# Patient Record
Sex: FEMALE | Race: BLACK OR AFRICAN AMERICAN | HISPANIC OR LATINO | URBAN - METROPOLITAN AREA
[De-identification: names, ages, dates, MRNs, and addresses within clinical notes are randomized per-mention and may not be internally consistent; named-entity substitution may affect disease eponyms.]

---

## 2021-10-20 ENCOUNTER — IMMUNIZATION (OUTPATIENT)
Dept: PRIMARY CARE CLINIC | Facility: CLINIC | Age: 51
End: 2021-10-20

## 2021-10-20 DIAGNOSIS — Z23 NEED FOR VACCINATION: Primary | ICD-10-CM

## 2021-10-20 PROCEDURE — 0001A COVID-19, MRNA, LNP-S, PF, 30 MCG/0.3 ML DOSE VACCINE: CPT | Mod: CV19,PBBFAC | Performed by: INTERNAL MEDICINE

## 2024-06-08 ENCOUNTER — HOSPITAL ENCOUNTER (INPATIENT)
Facility: HOSPITAL | Age: 54
LOS: 1 days | Discharge: HOME | DRG: 313 | End: 2024-06-10
Attending: STUDENT IN AN ORGANIZED HEALTH CARE EDUCATION/TRAINING PROGRAM | Admitting: HOSPITALIST

## 2024-06-08 DIAGNOSIS — R73.9 HYPERGLYCEMIA: ICD-10-CM

## 2024-06-08 DIAGNOSIS — R07.9 CHEST PAIN: Primary | ICD-10-CM

## 2024-06-08 PROCEDURE — 80053 COMPREHEN METABOLIC PANEL: CPT | Performed by: STUDENT IN AN ORGANIZED HEALTH CARE EDUCATION/TRAINING PROGRAM

## 2024-06-08 PROCEDURE — 83036 HEMOGLOBIN GLYCOSYLATED A1C: CPT | Performed by: STUDENT IN AN ORGANIZED HEALTH CARE EDUCATION/TRAINING PROGRAM

## 2024-06-08 PROCEDURE — 83880 ASSAY OF NATRIURETIC PEPTIDE: CPT | Performed by: STUDENT IN AN ORGANIZED HEALTH CARE EDUCATION/TRAINING PROGRAM

## 2024-06-08 PROCEDURE — 85025 COMPLETE CBC W/AUTO DIFF WBC: CPT | Performed by: STUDENT IN AN ORGANIZED HEALTH CARE EDUCATION/TRAINING PROGRAM

## 2024-06-08 PROCEDURE — 84484 ASSAY OF TROPONIN QUANT: CPT | Performed by: STUDENT IN AN ORGANIZED HEALTH CARE EDUCATION/TRAINING PROGRAM

## 2024-06-08 PROCEDURE — 99285 EMERGENCY DEPT VISIT HI MDM: CPT | Mod: 25

## 2024-06-08 PROCEDURE — 83735 ASSAY OF MAGNESIUM: CPT | Performed by: STUDENT IN AN ORGANIZED HEALTH CARE EDUCATION/TRAINING PROGRAM

## 2024-06-09 ENCOUNTER — CLINICAL SUPPORT (OUTPATIENT)
Dept: CARDIOLOGY | Facility: HOSPITAL | Age: 54
DRG: 313 | End: 2024-06-09
Attending: HOSPITALIST

## 2024-06-09 VITALS — WEIGHT: 148 LBS | BODY MASS INDEX: 27.23 KG/M2 | HEIGHT: 62 IN

## 2024-06-09 VITALS — BODY MASS INDEX: 27.07 KG/M2 | HEIGHT: 62 IN

## 2024-06-09 PROBLEM — E11.9 DIABETES MELLITUS: Status: ACTIVE | Noted: 2024-06-09

## 2024-06-09 PROBLEM — K76.6 PORTAL HYPERTENSION: Status: ACTIVE | Noted: 2024-06-09

## 2024-06-09 PROBLEM — I10 ESSENTIAL HYPERTENSION: Status: ACTIVE | Noted: 2024-06-09

## 2024-06-09 PROBLEM — K76.6 PORTAL HYPERTENSION: Status: RESOLVED | Noted: 2024-06-09 | Resolved: 2024-06-09

## 2024-06-09 PROBLEM — R07.9 CHEST PAIN: Status: ACTIVE | Noted: 2024-06-09

## 2024-06-09 LAB
ALBUMIN SERPL BCP-MCNC: 3.5 G/DL (ref 3.5–5.2)
ALBUMIN SERPL BCP-MCNC: 3.8 G/DL (ref 3.5–5.2)
ALP SERPL-CCNC: 141 U/L (ref 55–135)
ALP SERPL-CCNC: 184 U/L (ref 55–135)
ALT SERPL W/O P-5'-P-CCNC: 15 U/L (ref 10–44)
ALT SERPL W/O P-5'-P-CCNC: 18 U/L (ref 10–44)
ANION GAP SERPL CALC-SCNC: 7 MMOL/L (ref 8–16)
ANION GAP SERPL CALC-SCNC: 8 MMOL/L (ref 8–16)
AORTIC ROOT ANNULUS: 2.6 CM
AORTIC VALVE CUSP SEPERATION: 1.6 CM
ASCENDING AORTA: 2.6 CM
AST SERPL-CCNC: 13 U/L (ref 10–40)
AST SERPL-CCNC: 14 U/L (ref 10–40)
AV INDEX (PROSTH): 0.81
AV MEAN GRADIENT: 2 MMHG
AV PEAK GRADIENT: 5 MMHG
AV VALVE AREA BY VELOCITY RATIO: 1.9 CM²
AV VALVE AREA: 2.29 CM²
AV VELOCITY RATIO: 0.67
BASOPHILS # BLD AUTO: 0.04 K/UL (ref 0–0.2)
BASOPHILS # BLD AUTO: 0.06 K/UL (ref 0–0.2)
BASOPHILS NFR BLD: 0.5 % (ref 0–1.9)
BASOPHILS NFR BLD: 0.7 % (ref 0–1.9)
BILIRUB SERPL-MCNC: 0.4 MG/DL (ref 0.1–1)
BILIRUB SERPL-MCNC: 0.5 MG/DL (ref 0.1–1)
BNP SERPL-MCNC: 17 PG/ML (ref 0–99)
BSA FOR ECHO PROCEDURE: 1.71 M2
BUN SERPL-MCNC: 7 MG/DL (ref 6–20)
BUN SERPL-MCNC: 8 MG/DL (ref 6–20)
CALCIUM SERPL-MCNC: 8.2 MG/DL (ref 8.7–10.5)
CALCIUM SERPL-MCNC: 8.6 MG/DL (ref 8.7–10.5)
CHLORIDE SERPL-SCNC: 103 MMOL/L (ref 95–110)
CHLORIDE SERPL-SCNC: 103 MMOL/L (ref 95–110)
CHOLEST SERPL-MCNC: 145 MG/DL (ref 120–199)
CHOLEST/HDLC SERPL: 3.6 {RATIO} (ref 2–5)
CO2 SERPL-SCNC: 23 MMOL/L (ref 23–29)
CO2 SERPL-SCNC: 25 MMOL/L (ref 23–29)
CREAT SERPL-MCNC: 0.5 MG/DL (ref 0.5–1.4)
CREAT SERPL-MCNC: 0.6 MG/DL (ref 0.5–1.4)
CV ECHO LV RWT: 0.62 CM
CV PHARM DOSE: 0.4 MG
CV STRESS BASE HR: 83 BPM
DIASTOLIC BLOOD PRESSURE: 81 MMHG
DIFFERENTIAL METHOD BLD: ABNORMAL
DIFFERENTIAL METHOD BLD: NORMAL
DOP CALC AO PEAK VEL: 1.09 M/S
DOP CALC AO VTI: 17.8 CM
DOP CALC LVOT AREA: 2.8 CM2
DOP CALC LVOT DIAMETER: 1.9 CM
DOP CALC LVOT PEAK VEL: 0.73 M/S
DOP CALC LVOT STROKE VOLUME: 40.81 CM3
DOP CALC MV VTI: 16.9 CM
DOP CALCLVOT PEAK VEL VTI: 14.4 CM
E WAVE DECELERATION TIME: 172 MSEC
E/A RATIO: 0.68
E/E' RATIO: 5.47 M/S
ECHO LV POSTERIOR WALL: 0.92 CM (ref 0.6–1.1)
EOSINOPHIL # BLD AUTO: 0.2 K/UL (ref 0–0.5)
EOSINOPHIL # BLD AUTO: 0.2 K/UL (ref 0–0.5)
EOSINOPHIL NFR BLD: 2.3 % (ref 0–8)
EOSINOPHIL NFR BLD: 2.5 % (ref 0–8)
ERYTHROCYTE [DISTWIDTH] IN BLOOD BY AUTOMATED COUNT: 12.3 % (ref 11.5–14.5)
ERYTHROCYTE [DISTWIDTH] IN BLOOD BY AUTOMATED COUNT: 12.4 % (ref 11.5–14.5)
EST. GFR  (NO RACE VARIABLE): >60 ML/MIN/1.73 M^2
EST. GFR  (NO RACE VARIABLE): >60 ML/MIN/1.73 M^2
ESTIMATED AVG GLUCOSE: 384 MG/DL (ref 68–131)
FRACTIONAL SHORTENING: 32 % (ref 28–44)
GLUCOSE SERPL-MCNC: 291 MG/DL (ref 70–110)
GLUCOSE SERPL-MCNC: 299 MG/DL (ref 70–110)
GLUCOSE SERPL-MCNC: 312 MG/DL (ref 70–110)
GLUCOSE SERPL-MCNC: 386 MG/DL (ref 70–110)
GLUCOSE SERPL-MCNC: 414 MG/DL (ref 70–110)
HBA1C MFR BLD: >15 % (ref 4.5–6.2)
HCT VFR BLD AUTO: 36.6 % (ref 37–48.5)
HCT VFR BLD AUTO: 37 % (ref 37–48.5)
HDLC SERPL-MCNC: 40 MG/DL (ref 40–75)
HDLC SERPL: 27.6 % (ref 20–50)
HGB BLD-MCNC: 12.6 G/DL (ref 12–16)
HGB BLD-MCNC: 12.8 G/DL (ref 12–16)
IMM GRANULOCYTES # BLD AUTO: 0.01 K/UL (ref 0–0.04)
IMM GRANULOCYTES # BLD AUTO: 0.02 K/UL (ref 0–0.04)
IMM GRANULOCYTES NFR BLD AUTO: 0.1 % (ref 0–0.5)
IMM GRANULOCYTES NFR BLD AUTO: 0.2 % (ref 0–0.5)
INTERVENTRICULAR SEPTUM: 1.02 CM (ref 0.6–1.1)
LA MAJOR: 3.8 CM
LA MINOR: 3 CM
LA WIDTH: 2 CM
LDLC SERPL CALC-MCNC: 62.2 MG/DL (ref 63–159)
LEFT ATRIUM SIZE: 3 CM
LEFT ATRIUM VOLUME INDEX MOD: 12.4 ML/M2
LEFT ATRIUM VOLUME INDEX: 10.2 ML/M2
LEFT ATRIUM VOLUME MOD: 20.8 CM3
LEFT ATRIUM VOLUME: 17.1 CM3
LEFT INTERNAL DIMENSION IN SYSTOLE: 2.04 CM (ref 2.1–4)
LEFT VENTRICLE DIASTOLIC VOLUME INDEX: 20.65 ML/M2
LEFT VENTRICLE DIASTOLIC VOLUME: 34.7 ML
LEFT VENTRICLE MASS INDEX: 46 G/M2
LEFT VENTRICLE SYSTOLIC VOLUME INDEX: 8 ML/M2
LEFT VENTRICLE SYSTOLIC VOLUME: 13.4 ML
LEFT VENTRICULAR INTERNAL DIMENSION IN DIASTOLE: 2.99 CM (ref 3.5–6)
LEFT VENTRICULAR MASS: 78.05 G
LV LATERAL E/E' RATIO: 4.73 M/S
LV SEPTAL E/E' RATIO: 6.5 M/S
LVOT MG: 1 MMHG
LVOT MV: 0.46 CM/S
LYMPHOCYTES # BLD AUTO: 3.2 K/UL (ref 1–4.8)
LYMPHOCYTES # BLD AUTO: 4.1 K/UL (ref 1–4.8)
LYMPHOCYTES NFR BLD: 41.2 % (ref 18–48)
LYMPHOCYTES NFR BLD: 49.1 % (ref 18–48)
MAGNESIUM SERPL-MCNC: 1.6 MG/DL (ref 1.6–2.6)
MAGNESIUM SERPL-MCNC: 1.7 MG/DL (ref 1.6–2.6)
MCH RBC QN AUTO: 30.1 PG (ref 27–31)
MCH RBC QN AUTO: 30.2 PG (ref 27–31)
MCHC RBC AUTO-ENTMCNC: 34.4 G/DL (ref 32–36)
MCHC RBC AUTO-ENTMCNC: 34.6 G/DL (ref 32–36)
MCV RBC AUTO: 87 FL (ref 82–98)
MCV RBC AUTO: 88 FL (ref 82–98)
MONOCYTES # BLD AUTO: 0.5 K/UL (ref 0.3–1)
MONOCYTES # BLD AUTO: 0.6 K/UL (ref 0.3–1)
MONOCYTES NFR BLD: 6.7 % (ref 4–15)
MONOCYTES NFR BLD: 7 % (ref 4–15)
MV MEAN GRADIENT: 1 MMHG
MV PEAK A VEL: 0.76 M/S
MV PEAK E VEL: 0.52 M/S
MV PEAK GRADIENT: 3 MMHG
MV STENOSIS PRESSURE HALF TIME: 50 MS
MV VALVE AREA BY CONTINUITY EQUATION: 2.41 CM2
MV VALVE AREA P 1/2 METHOD: 4.4 CM2
NEUTROPHILS # BLD AUTO: 3.4 K/UL (ref 1.8–7.7)
NEUTROPHILS # BLD AUTO: 3.8 K/UL (ref 1.8–7.7)
NEUTROPHILS NFR BLD: 40.7 % (ref 38–73)
NEUTROPHILS NFR BLD: 49 % (ref 38–73)
NONHDLC SERPL-MCNC: 105 MG/DL
NRBC BLD-RTO: 0 /100 WBC
NRBC BLD-RTO: 0 /100 WBC
OHS CV CPX 1 MINUTE RECOVERY HEART RATE: 91 BPM
OHS CV CPX 85 PERCENT MAX PREDICTED HEART RATE MALE: 139
OHS CV CPX MAX PREDICTED HEART RATE: 164
OHS CV CPX PATIENT IS FEMALE: 1
OHS CV CPX PATIENT IS MALE: 0
OHS CV CPX PEAK DIASTOLIC BLOOD PRESSURE: 92 MMHG
OHS CV CPX PEAK HEAR RATE: 92 BPM
OHS CV CPX PEAK RATE PRESSURE PRODUCT: NORMAL
OHS CV CPX PEAK SYSTOLIC BLOOD PRESSURE: 141 MMHG
OHS CV CPX PERCENT MAX PREDICTED HEART RATE ACHIEVED: 59
OHS CV CPX RATE PRESSURE PRODUCT PRESENTING: NORMAL
OHS LV EJECTION FRACTION SIMPSONS BIPLANE MOD: 59 %
PLATELET # BLD AUTO: 266 K/UL (ref 150–450)
PLATELET # BLD AUTO: 268 K/UL (ref 150–450)
PMV BLD AUTO: 10.4 FL (ref 9.2–12.9)
PMV BLD AUTO: 10.9 FL (ref 9.2–12.9)
POTASSIUM SERPL-SCNC: 3.8 MMOL/L (ref 3.5–5.1)
POTASSIUM SERPL-SCNC: 3.9 MMOL/L (ref 3.5–5.1)
PROT SERPL-MCNC: 6.2 G/DL (ref 6–8.4)
PROT SERPL-MCNC: 6.7 G/DL (ref 6–8.4)
PV MV: 0.57 M/S
PV PEAK GRADIENT: 3 MMHG
PV PEAK VELOCITY: 0.83 M/S
RA MAJOR: 3.28 CM
RA PRESSURE ESTIMATED: 3 MMHG
RA WIDTH: 1.76 CM
RBC # BLD AUTO: 4.18 M/UL (ref 4–5.4)
RBC # BLD AUTO: 4.24 M/UL (ref 4–5.4)
RIGHT VENTRICULAR LENGTH IN DIASTOLE (APICAL 4-CHAMBER VIEW): 5.1 CM
RV MID DIAMA: 0.81 CM
RV TISSUE DOPPLER FREE WALL SYSTOLIC VELOCITY 1 (APICAL 4 CHAMBER VIEW): 11.2 CM/S
SODIUM SERPL-SCNC: 133 MMOL/L (ref 136–145)
SODIUM SERPL-SCNC: 136 MMOL/L (ref 136–145)
SYSTOLIC BLOOD PRESSURE: 134 MMHG
TDI LATERAL: 0.11 M/S
TDI SEPTAL: 0.08 M/S
TDI: 0.1 M/S
TRICUSPID ANNULAR PLANE SYSTOLIC EXCURSION: 2.43 CM
TRIGL SERPL-MCNC: 214 MG/DL (ref 30–150)
TROPONIN I SERPL HS-MCNC: 12.9 PG/ML (ref 0–14.9)
TROPONIN I SERPL HS-MCNC: 18.2 PG/ML (ref 0–14.9)
TROPONIN I SERPL HS-MCNC: 7.9 PG/ML (ref 0–14.9)
WBC # BLD AUTO: 7.74 K/UL (ref 3.9–12.7)
WBC # BLD AUTO: 8.43 K/UL (ref 3.9–12.7)
Z-SCORE OF LEFT VENTRICULAR DIMENSION IN END DIASTOLE: -4.5
Z-SCORE OF LEFT VENTRICULAR DIMENSION IN END SYSTOLE: -2.79

## 2024-06-09 PROCEDURE — 25000003 PHARM REV CODE 250: Performed by: HOSPITALIST

## 2024-06-09 PROCEDURE — 25000003 PHARM REV CODE 250: Performed by: STUDENT IN AN ORGANIZED HEALTH CARE EDUCATION/TRAINING PROGRAM

## 2024-06-09 PROCEDURE — 12000002 HC ACUTE/MED SURGE SEMI-PRIVATE ROOM

## 2024-06-09 PROCEDURE — 93005 ELECTROCARDIOGRAM TRACING: CPT | Performed by: GENERAL PRACTICE

## 2024-06-09 PROCEDURE — 93306 TTE W/DOPPLER COMPLETE: CPT

## 2024-06-09 PROCEDURE — 93306 TTE W/DOPPLER COMPLETE: CPT | Mod: 26,,, | Performed by: STUDENT IN AN ORGANIZED HEALTH CARE EDUCATION/TRAINING PROGRAM

## 2024-06-09 PROCEDURE — 84484 ASSAY OF TROPONIN QUANT: CPT | Mod: 91 | Performed by: HOSPITALIST

## 2024-06-09 PROCEDURE — 93017 CV STRESS TEST TRACING ONLY: CPT

## 2024-06-09 PROCEDURE — 94761 N-INVAS EAR/PLS OXIMETRY MLT: CPT

## 2024-06-09 PROCEDURE — 85025 COMPLETE CBC W/AUTO DIFF WBC: CPT | Performed by: HOSPITALIST

## 2024-06-09 PROCEDURE — 63600175 PHARM REV CODE 636 W HCPCS: Performed by: HOSPITALIST

## 2024-06-09 PROCEDURE — 96360 HYDRATION IV INFUSION INIT: CPT

## 2024-06-09 PROCEDURE — 93016 CV STRESS TEST SUPVJ ONLY: CPT | Mod: ,,, | Performed by: STUDENT IN AN ORGANIZED HEALTH CARE EDUCATION/TRAINING PROGRAM

## 2024-06-09 PROCEDURE — 93010 ELECTROCARDIOGRAM REPORT: CPT | Mod: ,,, | Performed by: GENERAL PRACTICE

## 2024-06-09 PROCEDURE — 84484 ASSAY OF TROPONIN QUANT: CPT | Performed by: STUDENT IN AN ORGANIZED HEALTH CARE EDUCATION/TRAINING PROGRAM

## 2024-06-09 PROCEDURE — 83735 ASSAY OF MAGNESIUM: CPT | Performed by: HOSPITALIST

## 2024-06-09 PROCEDURE — 80053 COMPREHEN METABOLIC PANEL: CPT | Performed by: HOSPITALIST

## 2024-06-09 PROCEDURE — 93018 CV STRESS TEST I&R ONLY: CPT | Mod: ,,, | Performed by: STUDENT IN AN ORGANIZED HEALTH CARE EDUCATION/TRAINING PROGRAM

## 2024-06-09 PROCEDURE — 80061 LIPID PANEL: CPT | Performed by: HOSPITALIST

## 2024-06-09 RX ORDER — ACETAMINOPHEN 325 MG/1
650 TABLET ORAL EVERY 8 HOURS PRN
Status: DISCONTINUED | OUTPATIENT
Start: 2024-06-09 | End: 2024-06-10 | Stop reason: HOSPADM

## 2024-06-09 RX ORDER — HYDRALAZINE HYDROCHLORIDE 20 MG/ML
10 INJECTION INTRAMUSCULAR; INTRAVENOUS
Status: DISCONTINUED | OUTPATIENT
Start: 2024-06-09 | End: 2024-06-10 | Stop reason: HOSPADM

## 2024-06-09 RX ORDER — ALUMINUM HYDROXIDE, MAGNESIUM HYDROXIDE, AND SIMETHICONE 1200; 120; 1200 MG/30ML; MG/30ML; MG/30ML
30 SUSPENSION ORAL 4 TIMES DAILY PRN
Status: DISCONTINUED | OUTPATIENT
Start: 2024-06-09 | End: 2024-06-10 | Stop reason: HOSPADM

## 2024-06-09 RX ORDER — ATORVASTATIN CALCIUM 40 MG/1
40 TABLET, FILM COATED ORAL DAILY
Status: DISCONTINUED | OUTPATIENT
Start: 2024-06-09 | End: 2024-06-10 | Stop reason: HOSPADM

## 2024-06-09 RX ORDER — IBUPROFEN 200 MG
16 TABLET ORAL
Status: DISCONTINUED | OUTPATIENT
Start: 2024-06-09 | End: 2024-06-10 | Stop reason: HOSPADM

## 2024-06-09 RX ORDER — CARVEDILOL 3.12 MG/1
3.12 TABLET ORAL 2 TIMES DAILY
Status: DISCONTINUED | OUTPATIENT
Start: 2024-06-09 | End: 2024-06-10 | Stop reason: HOSPADM

## 2024-06-09 RX ORDER — NITROGLYCERIN 0.4 MG/1
0.4 TABLET SUBLINGUAL EVERY 5 MIN PRN
Status: DISCONTINUED | OUTPATIENT
Start: 2024-06-09 | End: 2024-06-10 | Stop reason: HOSPADM

## 2024-06-09 RX ORDER — NAPROXEN SODIUM 220 MG/1
81 TABLET, FILM COATED ORAL DAILY
Status: DISCONTINUED | OUTPATIENT
Start: 2024-06-09 | End: 2024-06-10 | Stop reason: HOSPADM

## 2024-06-09 RX ORDER — INSULIN ASPART 100 [IU]/ML
0-10 INJECTION, SOLUTION INTRAVENOUS; SUBCUTANEOUS EVERY 6 HOURS PRN
Status: DISCONTINUED | OUTPATIENT
Start: 2024-06-09 | End: 2024-06-10 | Stop reason: HOSPADM

## 2024-06-09 RX ORDER — REGADENOSON 0.08 MG/ML
0.4 INJECTION, SOLUTION INTRAVENOUS
Status: COMPLETED | OUTPATIENT
Start: 2024-06-09 | End: 2024-06-09

## 2024-06-09 RX ORDER — IBUPROFEN 200 MG
24 TABLET ORAL
Status: DISCONTINUED | OUTPATIENT
Start: 2024-06-09 | End: 2024-06-10 | Stop reason: HOSPADM

## 2024-06-09 RX ORDER — AMOXICILLIN 250 MG
1 CAPSULE ORAL DAILY PRN
Status: DISCONTINUED | OUTPATIENT
Start: 2024-06-09 | End: 2024-06-10 | Stop reason: HOSPADM

## 2024-06-09 RX ORDER — ONDANSETRON HYDROCHLORIDE 2 MG/ML
4 INJECTION, SOLUTION INTRAVENOUS EVERY 6 HOURS PRN
Status: DISCONTINUED | OUTPATIENT
Start: 2024-06-09 | End: 2024-06-10 | Stop reason: HOSPADM

## 2024-06-09 RX ORDER — GLUCAGON 1 MG
1 KIT INJECTION
Status: DISCONTINUED | OUTPATIENT
Start: 2024-06-09 | End: 2024-06-10 | Stop reason: HOSPADM

## 2024-06-09 RX ORDER — GLUCAGON 1 MG
1 KIT INJECTION
Status: DISCONTINUED | OUTPATIENT
Start: 2024-06-09 | End: 2024-06-09

## 2024-06-09 RX ORDER — SODIUM CHLORIDE 9 MG/ML
INJECTION, SOLUTION INTRAVENOUS CONTINUOUS
Status: DISCONTINUED | OUTPATIENT
Start: 2024-06-09 | End: 2024-06-10 | Stop reason: HOSPADM

## 2024-06-09 RX ORDER — NALOXONE HCL 0.4 MG/ML
0.02 VIAL (ML) INJECTION
Status: DISCONTINUED | OUTPATIENT
Start: 2024-06-09 | End: 2024-06-10 | Stop reason: HOSPADM

## 2024-06-09 RX ORDER — TALC
6 POWDER (GRAM) TOPICAL NIGHTLY PRN
Status: DISCONTINUED | OUTPATIENT
Start: 2024-06-09 | End: 2024-06-10 | Stop reason: HOSPADM

## 2024-06-09 RX ORDER — ACETAMINOPHEN 325 MG/1
650 TABLET ORAL EVERY 4 HOURS PRN
Status: DISCONTINUED | OUTPATIENT
Start: 2024-06-09 | End: 2024-06-10 | Stop reason: HOSPADM

## 2024-06-09 RX ORDER — LANOLIN ALCOHOL/MO/W.PET/CERES
800 CREAM (GRAM) TOPICAL
Status: DISCONTINUED | OUTPATIENT
Start: 2024-06-09 | End: 2024-06-10 | Stop reason: HOSPADM

## 2024-06-09 RX ORDER — SODIUM,POTASSIUM PHOSPHATES 280-250MG
2 POWDER IN PACKET (EA) ORAL
Status: DISCONTINUED | OUTPATIENT
Start: 2024-06-09 | End: 2024-06-10 | Stop reason: HOSPADM

## 2024-06-09 RX ORDER — INSULIN GLARGINE 100 [IU]/ML
4 INJECTION, SOLUTION SUBCUTANEOUS DAILY
Status: DISCONTINUED | OUTPATIENT
Start: 2024-06-09 | End: 2024-06-10

## 2024-06-09 RX ORDER — HYDROCODONE BITARTRATE AND ACETAMINOPHEN 5; 325 MG/1; MG/1
1 TABLET ORAL EVERY 6 HOURS PRN
Status: DISCONTINUED | OUTPATIENT
Start: 2024-06-09 | End: 2024-06-10 | Stop reason: HOSPADM

## 2024-06-09 RX ORDER — SODIUM CHLORIDE 0.9 % (FLUSH) 0.9 %
10 SYRINGE (ML) INJECTION EVERY 12 HOURS PRN
Status: DISCONTINUED | OUTPATIENT
Start: 2024-06-09 | End: 2024-06-10 | Stop reason: HOSPADM

## 2024-06-09 RX ADMIN — SODIUM CHLORIDE 1000 ML: 9 INJECTION, SOLUTION INTRAVENOUS at 03:06

## 2024-06-09 RX ADMIN — SODIUM CHLORIDE: 9 INJECTION, SOLUTION INTRAVENOUS at 05:06

## 2024-06-09 RX ADMIN — INSULIN ASPART 5 UNITS: 100 INJECTION, SOLUTION INTRAVENOUS; SUBCUTANEOUS at 10:06

## 2024-06-09 RX ADMIN — REGADENOSON 0.4 MG: 0.08 INJECTION, SOLUTION INTRAVENOUS at 11:06

## 2024-06-09 RX ADMIN — CARVEDILOL 3.12 MG: 3.12 TABLET, FILM COATED ORAL at 08:06

## 2024-06-09 RX ADMIN — ASPIRIN 81 MG 81 MG: 81 TABLET ORAL at 08:06

## 2024-06-09 RX ADMIN — INSULIN ASPART 6 UNITS: 100 INJECTION, SOLUTION INTRAVENOUS; SUBCUTANEOUS at 08:06

## 2024-06-09 RX ADMIN — CARVEDILOL 3.12 MG: 3.12 TABLET, FILM COATED ORAL at 09:06

## 2024-06-09 RX ADMIN — ATORVASTATIN CALCIUM 40 MG: 40 TABLET, FILM COATED ORAL at 08:06

## 2024-06-09 RX ADMIN — INSULIN GLARGINE 4 UNITS: 100 INJECTION, SOLUTION SUBCUTANEOUS at 01:06

## 2024-06-09 NOTE — PLAN OF CARE
Patient seen and examined.  Family at bedside.  Requested use of .  Used  [id number].    Patient denies current chest pain.  Denies any shortness a breath headache or any other complaints at this time.  Informed her that stress test is negative.  Echocardiogram with normal wall motion, normal systolic and diastolic function.  No further cardiac workup needed at this time.    What concerns me at this point is her A1c being greater than 15.  She tells me she takes metformin at home for diabetes.  I informed her that I do not think metformin alone is enough at this point and we need to start insulin.  I started her on long-acting insulin this morning.  We will need to monitor on this and titrate up.  We will need case management involvement with discharge planning for medications as patient is self pay.  We will consult with diabetes educator. Hopeful for discharge tomorrow.

## 2024-06-09 NOTE — PLAN OF CARE
[facility] - Emergency Dept  Initial Discharge Assessment       Primary Care Provider: No, Primary Doctor    Admission Diagnosis: Chest pain [R07.9]    Admission Date: 6/8/2024  Expected Discharge Date:     Pt is a 54-year-old female who arrived from home with Chest Pain problem. Information verified as correct on facesheet. The assessment was completed at the patient's bedside with daughters.  Pt lives with Jagdish Harman (Daughter) [phone]. Pt does not have a Living Will or Advance Directive, Pt also needs an  (Albanian). The Pt is oriented to person, places, and times. Pt does not have a PCP.  Pt denies Coumadin, dialysis, DME, HH, and has not been readmitted into the hospital in the last thirty days.  Pt is capable of performing ADLs without assistance. Pt daughter her drives to and from medical appointments. Pt reports she was on medication, but  she was not taking it as prescribed; pharmacy used was [facility].  Pt verbalized plan to discharge home via family transport. Pt has no other needs to be addressed at this time. CM reviewed the chart and will continue to monitor.           Payor: /     Extended Emergency Contact Information  Primary Emergency Contact: SIENA CARMONA  Mobile Phone: [phone]  Relation: Son  Secondary Emergency Contact: Jagdish Harman  Mobile Phone: [phone]  Relation: Daughter  Preferred language: Albanian   needed? Yes    Discharge Plan A: Home with family  Discharge Plan B: Home with family    No Pharmacies Listed    Initial Assessment (most recent)       Adult Discharge Assessment - 06/09/24 1402          Discharge Assessment    Assessment Type Discharge Planning Assessment     Confirmed/corrected address, phone number and insurance Yes     Confirmed Demographics Correct on Facesheet     Source of Information patient;family     When was your last doctors appointment? --   Pt has a language barrier and she does not have a PCP. Need  to be referred for Medicaid.    Does patient/caregiver understand observation status Yes     Reason For Admission Chest Pain     People in Home child(brian), adult     Do you expect to return to your current living situation? Yes     Do you have help at home or someone to help you manage your care at home? Yes     Who are your caregiver(s) and their phone number(s)? Jagdish Harman  Daughter  [phone]  SIENA CARMONA (Son)  [phone] (Mobile)     Walking or Climbing Stairs Difficulty no     Dressing/Bathing Difficulty no     Home Layout Able to live on 1st floor     Equipment Currently Used at Home none     Readmission within 30 days? No     Patient currently being followed by outpatient case management? No     Do you currently have service(s) that help you manage your care at home? No     Do you take prescription medications? No     Do you have prescription coverage? No     Do you have any problems affording any of your prescribed medications? Yes   Pt does not have any insurance/language barrier    Is the patient taking medications as prescribed? no   Pt is not taking any meds right now    How do you get to doctors appointments? --   Pt does not go to the doctor due to no insurance.    Are you on dialysis? No     Do you take coumadin? No     Discharge Plan A Home with family     Discharge Plan B Home with family     DME Needed Upon Discharge  none

## 2024-06-09 NOTE — NURSING
Nurses Note -- 4 Eyes      6/9/2024   3:25 PM      Skin assessed during: Transfer      [x] No Altered Skin Integrity Present    []Prevention Measures Documented      [] Yes- Altered Skin Integrity Present or Discovered   [] LDA Added if Not in Epic (Describe Wound)   [] New Altered Skin Integrity was Present on Admit and Documented in LDA   [] Wound Image Taken    Wound Care Consulted? No    Attending Nurse:Nicholas Silva RN/Staff Member:   Lillie LOUIS

## 2024-06-09 NOTE — ASSESSMENT & PLAN NOTE
Follow troponin   Echocardiogram   Stress test in the morning  Aspirin 81 mg daily  Atorvastatin  Nitroglycerin  Lipid profile  Telemetry

## 2024-06-09 NOTE — ED PROVIDER NOTES
Encounter Date: 6/8/2024       History     Chief Complaint   Patient presents with    Chest Pain     Pt was in a car and screamed out in pain .  Pt says it is like I knife in the center of her chest     HPI    Stephanie Harman is a 54 y.o. female with a past medical history of hypertension and diabetes, presents with acute onset of chest pain.  Patient was in a car, coming home from a [employer] service with other [employer] members when she started feeling a sharp pain in the center of her chest.  Patient also reports that she felt short of breath, and felt like she wanted to cry.  Patient reports that the pain has improved now.  Patient receives 324 of aspirin with EMS in addition to sublingual nitro, and 500 cc of normal saline.  Patient reports that she does not take her blood pressure medication or diabetes medications secondary to feeling fine and only takes them when she feels sick.  Last time she took any of these medication was a week ago.  Denies headache, dizziness, nausea, vomiting, diaphoresis, dysuria, constipation, rash, lower extremity edema.  Patient reports she has had similar pain in the past but has not seen a primary care physician.      Review of patient's allergies indicates:  No Known Allergies  No past medical history on file.  No past surgical history on file.  No family history on file.     Review of Systems  See HPI above    Physical Exam     Initial Vitals [06/08/24 2255]   BP Pulse Resp Temp SpO2   (!) 164/101 93 20 98.1 °F (36.7 °C) 98 %      MAP       --         Physical Exam    Nursing note and vitals reviewed.  Constitutional: She appears well-developed and well-nourished.   HENT:   Head: Normocephalic and atraumatic.   Eyes: EOM are normal. Pupils are equal, round, and reactive to light.   Neck: Neck supple.   Normal range of motion.  Cardiovascular:  Normal rate, regular rhythm and normal heart sounds.           Pulmonary/Chest: Breath sounds normal. No respiratory distress. She has no wheezes.  She has no rhonchi. She has no rales. She exhibits no tenderness.   Abdominal: Abdomen is soft. She exhibits no distension. There is no abdominal tenderness.   Musculoskeletal:         General: No tenderness or edema. Normal range of motion.      Cervical back: Normal range of motion and neck supple.     Neurological: She is alert and oriented to person, place, and time. GCS score is 15. GCS eye subscore is 4. GCS verbal subscore is 5. GCS motor subscore is 6.   Skin: Skin is warm and dry. Capillary refill takes less than 2 seconds.         ED Course   Procedures  Labs Reviewed   CBC W/ AUTO DIFFERENTIAL - Abnormal; Notable for the following components:       Result Value    Hematocrit 36.6 (*)     Lymph % 49.1 (*)     All other components within normal limits   COMPREHENSIVE METABOLIC PANEL - Abnormal; Notable for the following components:    Sodium 133 (*)     Glucose 414 (*)     Calcium 8.6 (*)     Alkaline Phosphatase 184 (*)     Anion Gap 7 (*)     All other components within normal limits   TROPONIN I HIGH SENSITIVITY - Abnormal; Notable for the following components:    Troponin I High Sensitivity 18.2 (*)     All other components within normal limits   MAGNESIUM   TROPONIN I HIGH SENSITIVITY   B-TYPE NATRIURETIC PEPTIDE   HEMOGLOBIN A1C   COMPREHENSIVE METABOLIC PANEL   MAGNESIUM   CBC W/ AUTO DIFFERENTIAL   TROPONIN I HIGH SENSITIVITY   LIPID PANEL   POCT GLUCOSE MONITORING CONTINUOUS   POCT GLUCOSE MONITORING CONTINUOUS   POCT GLUCOSE MONITORING CONTINUOUS     EKG Readings: (Independently Interpreted)   Initial Reading: No STEMI. Rhythm: Normal Sinus Rhythm. Heart Rate: 86. ST Segments: Normal ST Segments. T Waves: Normal.       Imaging Results              X-Ray Chest AP Portable (Final result)  Result time 06/08/24 23:47:10      Final result by Lorenzo Cote MD (06/08/24 23:47:10)                   Impression:      Tortuosity of the thoracic aorta.  Some component of ectasia may present.  Additional  evaluation, as clinically warranted.      Electronically signed by: Lorenzo Cote MD  Date:    06/08/2024  Time:    23:47               Narrative:    EXAMINATION:  XR CHEST AP PORTABLE    CLINICAL HISTORY:  Chest Pain;    TECHNIQUE:  Single frontal view of the chest was performed.    COMPARISON:  None    FINDINGS:  The trachea is unremarkable.  The cardiac silhouette is within normal limits.  There is tortuosity of the thoracic aorta.  The hilar structures are unremarkable.  There is no evidence of free air beneath the hemidiaphragms.  There are no pleural effusions.  There is no evidence of a pneumothorax.  There is no evidence of pneumomediastinum.  No airspace opacity is present.  There are degenerative changes in the osseous structures.                                       Medications   sodium chloride 0.9% flush 10 mL (has no administration in time range)   melatonin tablet 6 mg (has no administration in time range)   ondansetron injection 4 mg (has no administration in time range)   senna-docusate 8.6-50 mg per tablet 1 tablet (has no administration in time range)   acetaminophen tablet 650 mg (has no administration in time range)   aluminum-magnesium hydroxide-simethicone 200-200-20 mg/5 mL suspension 30 mL (has no administration in time range)   acetaminophen tablet 650 mg (has no administration in time range)   HYDROcodone-acetaminophen 5-325 mg per tablet 1 tablet (has no administration in time range)   naloxone 0.4 mg/mL injection 0.02 mg (has no administration in time range)   potassium bicarbonate disintegrating tablet 50 mEq (has no administration in time range)   potassium bicarbonate disintegrating tablet 35 mEq (has no administration in time range)   potassium bicarbonate disintegrating tablet 60 mEq (has no administration in time range)   magnesium oxide tablet 800 mg (has no administration in time range)   magnesium oxide tablet 800 mg (has no administration in time range)   potassium, sodium  phosphates 280-160-250 mg packet 2 packet (has no administration in time range)   potassium, sodium phosphates 280-160-250 mg packet 2 packet (has no administration in time range)   potassium, sodium phosphates 280-160-250 mg packet 2 packet (has no administration in time range)   glucose chewable tablet 16 g (has no administration in time range)   glucose chewable tablet 24 g (has no administration in time range)   dextrose 50% injection 25 g (has no administration in time range)   aspirin chewable tablet 81 mg (has no administration in time range)   nitroGLYCERIN SL tablet 0.4 mg (has no administration in time range)   atorvastatin tablet 40 mg (has no administration in time range)   dextrose 50% injection 12.5 g (has no administration in time range)   glucagon (human recombinant) injection 1 mg (has no administration in time range)   insulin aspart U-100 pen 0-10 Units (has no administration in time range)   hydrALAZINE injection 10 mg (has no administration in time range)   carvediloL tablet 3.125 mg (has no administration in time range)   0.9%  NaCl infusion ( Intravenous New Bag 6/9/24 0514)   sodium chloride 0.9% bolus 1,000 mL 1,000 mL (0 mLs Intravenous Stopped 6/9/24 3595)     Medical Decision Making  54 y.o. female with a past medical history of hypertension and diabetes, presents with acute onset of chest pain.  Patient was in a car, coming home from a [employer] service with other Rast[employer] members when she started feeling a sharp pain in the center of her chest.  Patient also reports that she felt short of breath, and felt like she wanted to cry.  Exam as above.On CBC patient has no leukocytosis or anemia.  On CMP patient has mild hyponatremia 133, with hypoglycemia 414, and an elevated alk-phos.  Creatinine was unremarkable.  A1c was sent, still pending.  BNP was -17.  Mag was within normal limits.  Initial troponin was 7.9, and on repeat increased at 18.2.  Secondary to this delta change in troponin, and a  heart score of 4, patient will be admitted for further evaluation and management.  In trending of troponin.    Amount and/or Complexity of Data Reviewed  Labs: ordered.  Radiology: ordered.    Risk  Decision regarding hospitalization.      Additional MDM:   Differential Diagnosis:   Symptom: Chest pain. <> The follow diagnoses were considered and will be evaluated: Acute MI, Atypical Chest Pain, Chest Pain, Chest Wall Pain, Costochondritis, GERD, Hemothorax, Myocarditis, Pneumonia, Pneumothorax, ST Elevation MI and Unstable Angina.      Heart Score:    History:          Moderately suspicious.  ECG:             Normal  Age:               45-65 years  Risk factors: 1-2 risk factors  Troponin:       1-2x normal limit  Heart Score = 4               Attending Attestation:   Physician Attestation Statement for Resident:  As the supervising MD   I agree with the above history.  -:   As the supervising MD I agree with the above PE.     As the supervising MD I agree with the above treatment, course, plan, and disposition.                                           Clinical Impression:  Final diagnoses:  [R07.9] Chest pain (Primary)  [R73.9] Hyperglycemia          ED Disposition Condition    Admit                 Amy Partida MD  Resident  06/09/24 0532       Hermes Navarro MD  06/09/24 0560

## 2024-06-09 NOTE — HPI
This is a 54-year-old female who presents to the hospital with chest pain.  History is taken from medical personnel as there is language barrier.  Patient was on the road in the car and began to have chest pain.  Pain was pressure-like.  No nausea or vomiting.  Patient has a history of diabetes and hypertension but noncompliant with her medication.  She was brought to the emergency room by way of ambulance.  She was given aspirin and nitroglycerin.  She currently is chest pain-free.  Second troponin show mild elevation at 18.  Due to her history and mild elevated troponin hospitalist asked to admit this patient for further care.

## 2024-06-09 NOTE — SUBJECTIVE & OBJECTIVE
No past medical history on file.    No past surgical history on file.    Review of patient's allergies indicates:  No Known Allergies    No current facility-administered medications on file prior to encounter.     No current outpatient medications on file prior to encounter.     Family History    None       Tobacco Use    Smoking status: Not on file    Smokeless tobacco: Not on file   Substance and Sexual Activity    Alcohol use: Not on file    Drug use: Not on file    Sexual activity: Not on file     Review of Systems   HENT: Negative.     Eyes: Negative.    Respiratory:  Positive for chest tightness.    Cardiovascular: Negative.    Gastrointestinal: Negative.    Musculoskeletal: Negative.    Skin: Negative.    Neurological: Negative.    All other systems reviewed and are negative.    Objective:     Vital Signs (Most Recent):  Temp: 98.1 °F (36.7 °C) (06/08/24 2255)  Pulse: 79 (06/09/24 0230)  Resp: 20 (06/08/24 2255)  BP: (!) 110/59 (06/09/24 0230)  SpO2: 100 % (06/09/24 0230) Vital Signs (24h Range):  Temp:  [98.1 °F (36.7 °C)] 98.1 °F (36.7 °C)  Pulse:  [79-93] 79  Resp:  [20] 20  SpO2:  [98 %-100 %] 100 %  BP: (110-164)/() 110/59     Weight: 67.1 kg (148 lb)  There is no height or weight on file to calculate BMI.     Physical Exam  Constitutional:       Appearance: Normal appearance.   HENT:      Head: Normocephalic and atraumatic.      Nose: Nose normal.   Eyes:      Pupils: Pupils are equal, round, and reactive to light.   Cardiovascular:      Rate and Rhythm: Normal rate and regular rhythm.   Pulmonary:      Effort: Pulmonary effort is normal.   Abdominal:      Palpations: Abdomen is soft.   Musculoskeletal:      Cervical back: Neck supple.   Skin:     General: Skin is warm.   Neurological:      General: No focal deficit present.      Mental Status: She is alert.   Psychiatric:         Behavior: Behavior normal.              CRANIAL NERVES     CN III, IV, VI   Pupils are equal, round, and reactive to  light.       Significant Labs: All pertinent labs within the past 24 hours have been reviewed.  Recent Lab Results         06/09/24  0309   06/08/24  2353        Albumin   3.8       ALP   184       ALT   18       Anion Gap   7       AST   14       Baso #   0.06       Basophil %   0.7       BILIRUBIN TOTAL   0.4  Comment: For infants and newborns, interpretation of results should be based  on gestational age, weight and in agreement with clinical  observations.    Premature Infant recommended reference ranges:  Up to 24 hours.............<8.0 mg/dL  Up to 48 hours............<12.0 mg/dL  3-5 days..................<15.0 mg/dL  6-29 days.................<15.0 mg/dL         BNP   17  Comment: Values of less than 100 pg/ml are consistent with non-CHF populations.       BUN   8       Calcium   8.6       Chloride   103       CO2   23       Creatinine   0.6       Differential Method   Automated       eGFR   >60.0       Eos #   0.2       Eos %   2.3       Glucose   414       Gran # (ANC)   3.4       Gran %   40.7       Hematocrit   36.6       Hemoglobin   12.6       Immature Grans (Abs)   0.02  Comment: Mild elevation in immature granulocytes is non specific and   can be seen in a variety of conditions including stress response,   acute inflammation, trauma and pregnancy. Correlation with other   laboratory and clinical findings is essential.         Immature Granulocytes   0.2       Lymph #   4.1       Lymph %   49.1       Magnesium    1.7       MCH   30.1       MCHC   34.4       MCV   88       Mono #   0.6       Mono %   7.0       MPV   10.9       nRBC   0       Platelet Count   268       Potassium   3.9       PROTEIN TOTAL   6.7       RBC   4.18       RDW   12.4       Sodium   133       Troponin I High Sensitivity 18.2  Comment: Troponin results differ between methods. Do not use   results between Troponin methods interchangeably.    Alkaline Phospatase levels above 400 U/L may   cause false positive results.    Access  hsTnI should not be used for patients taking   Asfotase frederick (Strensiq).     7.9  Comment: Troponin results differ between methods. Do not use   results between Troponin methods interchangeably.    Alkaline Phospatase levels above 400 U/L may   cause false positive results.    Access hsTnI should not be used for patients taking   Asfotase frederick (Strensiq).         WBC   8.43               Significant Imaging: I have reviewed all pertinent imaging results/findings within the past 24 hours.

## 2024-06-09 NOTE — H&P
[facility] - Emergency Dept  Hospital Medicine  History & Physical    Patient Name: Stephanie Harman  MRN: [id number]  Patient Class: IP- Inpatient  Admission Date: 6/8/2024  Attending Physician: Grady Ruiz MD  Primary Care Provider: Vandana, Primary Doctor         Patient information was obtained from patient and ER records.     Subjective:     Principal Problem:<principal problem not specified>    Chief Complaint:   Chief Complaint   Patient presents with    Chest Pain     Pt was in a car and screamed out in pain .  Pt says it is like I knife in the center of her chest        HPI: This is a 54-year-old female who presents to the hospital with chest pain.  History is taken from medical personnel as there is language barrier.  Patient was on the road in the car and began to have chest pain.  Pain was pressure-like.  No nausea or vomiting.  Patient has a history of diabetes and hypertension but noncompliant with her medication.  She was brought to the emergency room by way of ambulance.  She was given aspirin and nitroglycerin.  She currently is chest pain-free.  Second troponin show mild elevation at 18.  Due to her history and mild elevated troponin hospitalist asked to admit this patient for further care.    No past medical history on file.    No past surgical history on file.    Review of patient's allergies indicates:  No Known Allergies    No current facility-administered medications on file prior to encounter.     No current outpatient medications on file prior to encounter.     Family History    None       Tobacco Use    Smoking status: Not on file    Smokeless tobacco: Not on file   Substance and Sexual Activity    Alcohol use: Not on file    Drug use: Not on file    Sexual activity: Not on file     Review of Systems   HENT: Negative.     Eyes: Negative.    Respiratory:  Positive for chest tightness.    Cardiovascular: Negative.    Gastrointestinal: Negative.    Musculoskeletal: Negative.    Skin:  Negative.    Neurological: Negative.    All other systems reviewed and are negative.    Objective:     Vital Signs (Most Recent):  Temp: 98.1 °F (36.7 °C) (06/08/24 2255)  Pulse: 79 (06/09/24 0230)  Resp: 20 (06/08/24 2255)  BP: (!) 110/59 (06/09/24 0230)  SpO2: 100 % (06/09/24 0230) Vital Signs (24h Range):  Temp:  [98.1 °F (36.7 °C)] 98.1 °F (36.7 °C)  Pulse:  [79-93] 79  Resp:  [20] 20  SpO2:  [98 %-100 %] 100 %  BP: (110-164)/() 110/59     Weight: 67.1 kg (148 lb)  There is no height or weight on file to calculate BMI.     Physical Exam  Constitutional:       Appearance: Normal appearance.   HENT:      Head: Normocephalic and atraumatic.      Nose: Nose normal.   Eyes:      Pupils: Pupils are equal, round, and reactive to light.   Cardiovascular:      Rate and Rhythm: Normal rate and regular rhythm.   Pulmonary:      Effort: Pulmonary effort is normal.   Abdominal:      Palpations: Abdomen is soft.   Musculoskeletal:      Cervical back: Neck supple.   Skin:     General: Skin is warm.   Neurological:      General: No focal deficit present.      Mental Status: She is alert.   Psychiatric:         Behavior: Behavior normal.              CRANIAL NERVES     CN III, IV, VI   Pupils are equal, round, and reactive to light.       Significant Labs: All pertinent labs within the past 24 hours have been reviewed.  Recent Lab Results         06/09/24  0309   06/08/24  2353        Albumin   3.8       ALP   184       ALT   18       Anion Gap   7       AST   14       Baso #   0.06       Basophil %   0.7       BILIRUBIN TOTAL   0.4  Comment: For infants and newborns, interpretation of results should be based  on gestational age, weight and in agreement with clinical  observations.    Premature Infant recommended reference ranges:  Up to 24 hours.............<8.0 mg/dL  Up to 48 hours............<12.0 mg/dL  3-5 days..................<15.0 mg/dL  6-29 days.................<15.0 mg/dL         BNP   17  Comment: Values of  less than 100 pg/ml are consistent with non-CHF populations.       BUN   8       Calcium   8.6       Chloride   103       CO2   23       Creatinine   0.6       Differential Method   Automated       eGFR   >60.0       Eos #   0.2       Eos %   2.3       Glucose   414       Gran # (ANC)   3.4       Gran %   40.7       Hematocrit   36.6       Hemoglobin   12.6       Immature Grans (Abs)   0.02  Comment: Mild elevation in immature granulocytes is non specific and   can be seen in a variety of conditions including stress response,   acute inflammation, trauma and pregnancy. Correlation with other   laboratory and clinical findings is essential.         Immature Granulocytes   0.2       Lymph #   4.1       Lymph %   49.1       Magnesium    1.7       MCH   30.1       MCHC   34.4       MCV   88       Mono #   0.6       Mono %   7.0       MPV   10.9       nRBC   0       Platelet Count   268       Potassium   3.9       PROTEIN TOTAL   6.7       RBC   4.18       RDW   12.4       Sodium   133       Troponin I High Sensitivity 18.2  Comment: Troponin results differ between methods. Do not use   results between Troponin methods interchangeably.    Alkaline Phospatase levels above 400 U/L may   cause false positive results.    Access hsTnI should not be used for patients taking   Asfotase frederick (Strensiq).     7.9  Comment: Troponin results differ between methods. Do not use   results between Troponin methods interchangeably.    Alkaline Phospatase levels above 400 U/L may   cause false positive results.    Access hsTnI should not be used for patients taking   Asfotase frederick (Strensiq).         WBC   8.43               Significant Imaging: I have reviewed all pertinent imaging results/findings within the past 24 hours.  Assessment/Plan:     Essential hypertension  Home medication not updated   Hydralazine p.r.n.   Carvedilol 3.125 mg b.i.d.    Diabetes mellitus  Patient does not know her medication    A1c   Insulin sliding scale    Diabetic education      Chest pain  Follow troponin   Echocardiogram   Stress test in the morning  Aspirin 81 mg daily  Atorvastatin  Nitroglycerin  Lipid profile  Telemetry        VTE Risk Mitigation (From admission, onward)           Ordered     IP VTE LOW RISK PATIENT  Once         06/09/24 0400     Place sequential compression device  Until discontinued         06/09/24 0400                                    Grady Ruiz MD  Department of Hospital Medicine  [facility] Dept

## 2024-06-09 NOTE — NURSING NOTE
Lexiscan portion of Stress Test completed without complications. Patient verbalized understanding of pre-post test instructions. Discharged from lab per Cardiology.  Services #[id number] used for all aspects of test. Verbalized understanding of all test instructions; all questions addressed and answered.

## 2024-06-10 VITALS
OXYGEN SATURATION: 97 % | HEIGHT: 62 IN | HEART RATE: 88 BPM | TEMPERATURE: 98 F | SYSTOLIC BLOOD PRESSURE: 121 MMHG | RESPIRATION RATE: 18 BRPM | BODY MASS INDEX: 27.22 KG/M2 | WEIGHT: 147.94 LBS | DIASTOLIC BLOOD PRESSURE: 70 MMHG

## 2024-06-10 LAB
ALBUMIN SERPL BCP-MCNC: 3 G/DL (ref 3.5–5.2)
ALP SERPL-CCNC: 114 U/L (ref 55–135)
ALT SERPL W/O P-5'-P-CCNC: 14 U/L (ref 10–44)
ANION GAP SERPL CALC-SCNC: 4 MMOL/L (ref 8–16)
AST SERPL-CCNC: 11 U/L (ref 10–40)
BASOPHILS # BLD AUTO: 0.05 K/UL (ref 0–0.2)
BASOPHILS NFR BLD: 0.7 % (ref 0–1.9)
BILIRUB SERPL-MCNC: 0.4 MG/DL (ref 0.1–1)
BUN SERPL-MCNC: 13 MG/DL (ref 6–20)
CALCIUM SERPL-MCNC: 7.6 MG/DL (ref 8.7–10.5)
CHLORIDE SERPL-SCNC: 108 MMOL/L (ref 95–110)
CO2 SERPL-SCNC: 24 MMOL/L (ref 23–29)
CREAT SERPL-MCNC: 0.5 MG/DL (ref 0.5–1.4)
DIFFERENTIAL METHOD BLD: ABNORMAL
EOSINOPHIL # BLD AUTO: 0.2 K/UL (ref 0–0.5)
EOSINOPHIL NFR BLD: 2.5 % (ref 0–8)
ERYTHROCYTE [DISTWIDTH] IN BLOOD BY AUTOMATED COUNT: 12.3 % (ref 11.5–14.5)
EST. GFR  (NO RACE VARIABLE): >60 ML/MIN/1.73 M^2
GLUCOSE SERPL-MCNC: 318 MG/DL (ref 70–110)
GLUCOSE SERPL-MCNC: 325 MG/DL (ref 70–110)
GLUCOSE SERPL-MCNC: 372 MG/DL (ref 70–110)
HCT VFR BLD AUTO: 33.4 % (ref 37–48.5)
HGB BLD-MCNC: 11.4 G/DL (ref 12–16)
IMM GRANULOCYTES # BLD AUTO: 0.01 K/UL (ref 0–0.04)
IMM GRANULOCYTES NFR BLD AUTO: 0.1 % (ref 0–0.5)
LYMPHOCYTES # BLD AUTO: 3.7 K/UL (ref 1–4.8)
LYMPHOCYTES NFR BLD: 53.5 % (ref 18–48)
MAGNESIUM SERPL-MCNC: 1.8 MG/DL (ref 1.6–2.6)
MCH RBC QN AUTO: 29.6 PG (ref 27–31)
MCHC RBC AUTO-ENTMCNC: 34.1 G/DL (ref 32–36)
MCV RBC AUTO: 87 FL (ref 82–98)
MONOCYTES # BLD AUTO: 0.4 K/UL (ref 0.3–1)
MONOCYTES NFR BLD: 5.9 % (ref 4–15)
NEUTROPHILS # BLD AUTO: 2.6 K/UL (ref 1.8–7.7)
NEUTROPHILS NFR BLD: 37.3 % (ref 38–73)
NRBC BLD-RTO: 0 /100 WBC
PLATELET # BLD AUTO: 257 K/UL (ref 150–450)
PMV BLD AUTO: 10.2 FL (ref 9.2–12.9)
POTASSIUM SERPL-SCNC: 3.7 MMOL/L (ref 3.5–5.1)
PROT SERPL-MCNC: 5.3 G/DL (ref 6–8.4)
RBC # BLD AUTO: 3.85 M/UL (ref 4–5.4)
SODIUM SERPL-SCNC: 136 MMOL/L (ref 136–145)
TROPONIN I SERPL HS-MCNC: 3.8 PG/ML (ref 0–14.9)
WBC # BLD AUTO: 6.92 K/UL (ref 3.9–12.7)

## 2024-06-10 PROCEDURE — 84484 ASSAY OF TROPONIN QUANT: CPT | Performed by: HOSPITALIST

## 2024-06-10 PROCEDURE — 83735 ASSAY OF MAGNESIUM: CPT | Performed by: HOSPITALIST

## 2024-06-10 PROCEDURE — 36415 COLL VENOUS BLD VENIPUNCTURE: CPT | Performed by: HOSPITALIST

## 2024-06-10 PROCEDURE — 85025 COMPLETE CBC W/AUTO DIFF WBC: CPT | Performed by: HOSPITALIST

## 2024-06-10 PROCEDURE — 80053 COMPREHEN METABOLIC PANEL: CPT | Performed by: HOSPITALIST

## 2024-06-10 PROCEDURE — 63600175 PHARM REV CODE 636 W HCPCS: Performed by: INTERNAL MEDICINE

## 2024-06-10 PROCEDURE — 25000003 PHARM REV CODE 250: Performed by: HOSPITALIST

## 2024-06-10 RX ORDER — ATORVASTATIN CALCIUM 40 MG/1
40 TABLET, FILM COATED ORAL DAILY
Qty: 30 TABLET | Refills: 0 | Status: SHIPPED | OUTPATIENT
Start: 2024-06-10 | End: 2024-07-10

## 2024-06-10 RX ORDER — INSULIN GLARGINE 100 [IU]/ML
12 INJECTION, SOLUTION SUBCUTANEOUS DAILY
Status: DISCONTINUED | OUTPATIENT
Start: 2024-06-10 | End: 2024-06-10 | Stop reason: HOSPADM

## 2024-06-10 RX ORDER — NAPROXEN SODIUM 220 MG/1
81 TABLET, FILM COATED ORAL DAILY
Qty: 30 TABLET | Refills: 0 | Status: SHIPPED | OUTPATIENT
Start: 2024-06-10 | End: 2024-07-10

## 2024-06-10 RX ORDER — INSULIN DETEMIR 100 [IU]/ML
12 INJECTION, SOLUTION SUBCUTANEOUS DAILY
Qty: 15 ML | Refills: 0 | Status: SHIPPED | OUTPATIENT
Start: 2024-06-10 | End: 2024-10-13

## 2024-06-10 RX ORDER — CARVEDILOL 3.12 MG/1
3.12 TABLET ORAL 2 TIMES DAILY
Qty: 60 TABLET | Refills: 0 | Status: SHIPPED | OUTPATIENT
Start: 2024-06-10 | End: 2024-07-10

## 2024-06-10 RX ADMIN — INSULIN ASPART 8 UNITS: 100 INJECTION, SOLUTION INTRAVENOUS; SUBCUTANEOUS at 05:06

## 2024-06-10 RX ADMIN — ASPIRIN 81 MG 81 MG: 81 TABLET ORAL at 09:06

## 2024-06-10 RX ADMIN — SODIUM CHLORIDE: 9 INJECTION, SOLUTION INTRAVENOUS at 05:06

## 2024-06-10 RX ADMIN — Medication 800 MG: at 08:06

## 2024-06-10 RX ADMIN — INSULIN ASPART 10 UNITS: 100 INJECTION, SOLUTION INTRAVENOUS; SUBCUTANEOUS at 12:06

## 2024-06-10 RX ADMIN — INSULIN GLARGINE 12 UNITS: 100 INJECTION, SOLUTION SUBCUTANEOUS at 10:06

## 2024-06-10 RX ADMIN — ATORVASTATIN CALCIUM 40 MG: 40 TABLET, FILM COATED ORAL at 09:06

## 2024-06-10 RX ADMIN — CARVEDILOL 3.12 MG: 3.12 TABLET, FILM COATED ORAL at 09:06

## 2024-06-10 NOTE — CONSULTS
"Diagnosis/Reason for Referral: Diabetes    Medical Nutrition Prescription: Diabetes Self Management Training        Anthropometrics  Ht Readings from Last 1 Encounters:   06/09/24 5' 2" (1.575 m)     Wt Readings from Last 1 Encounters:   06/09/24 67.1 kg (147 lb 14.9 oz)      BMI Readings from Last 1 Encounters:   06/09/24 27.06 kg/m²        Weight History:  Wt Readings from Last 5 Encounters:   06/09/24 67.1 kg (147 lb 14.9 oz)   06/09/24 67.1 kg (148 lb)      Instructions Provided:      Nutrition and Meal Planning    Diabetic diet  Plate Method  How to use insulin     Comments:    Diabetes educator able to speak [place]ic with patient. Patient reports that she cannot see well, d/t eye has been bothering her for the past 4 months, to be able to read handouts provided but noted that her daughter is able to read Green[place]ic and will help her. 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 Explained to patient that she will take insulin once a day, 12 units daily and that she may have an insulin syringe or pen. Showed patient where she can inject insulin, encouraged her to rotate sites every day and how to inject insulin. We briefly discussed how to use the plate method for meal planning. Patient voiced understanding of the information provided, handouts and contact information provided for questions.      I will call her in 2 weeks to follow up with her.     Labs  Clinical Chemistry:  CMP  Sodium   Date Value Ref Range Status   06/10/2024 136 136 - 145 mmol/L Final     Potassium   Date Value Ref Range Status   06/10/2024 3.7 3.5 - 5.1 mmol/L Final     Chloride   Date Value Ref Range Status   06/10/2024 108 95 - 110 mmol/L Final     CO2   Date Value Ref Range Status   06/10/2024 24 23 - 29 mmol/L Final     Glucose   Date Value Ref Range Status   06/10/2024 318 (H) 70 - 110 mg/dL Final     BUN   Date Value Ref Range Status   06/10/2024 13 6 - 20 mg/dL Final "     Creatinine   Date Value Ref Range Status   06/10/2024 0.5 0.5 - 1.4 mg/dL Final     Calcium   Date Value Ref Range Status   06/10/2024 7.6 (L) 8.7 - 10.5 mg/dL Final     Total Protein   Date Value Ref Range Status   06/10/2024 5.3 (L) 6.0 - 8.4 g/dL Final     Albumin   Date Value Ref Range Status   06/10/2024 3.0 (L) 3.5 - 5.2 g/dL Final     Total Bilirubin   Date Value Ref Range Status   06/10/2024 0.4 0.1 - 1.0 mg/dL Final     Comment:     For infants and newborns, interpretation of results should be based  on gestational age, weight and in agreement with clinical  observations.    Premature Infant recommended reference ranges:  Up to 24 hours.............<8.0 mg/dL  Up to 48 hours............<12.0 mg/dL  3-5 days..................<15.0 mg/dL  6-29 days.................<15.0 mg/dL       Alkaline Phosphatase   Date Value Ref Range Status   06/10/2024 114 55 - 135 U/L Final     AST   Date Value Ref Range Status   06/10/2024 11 10 - 40 U/L Final     ALT   Date Value Ref Range Status   06/10/2024 14 10 - 44 U/L Final     Anion Gap   Date Value Ref Range Status   06/10/2024 4 (L) 8 - 16 mmol/L Final      CBC:   Lab Results   Component Value Date    WBC 6.92 06/10/2024    HGB 11.4 (L) 06/10/2024    HCT 33.4 (L) 06/10/2024    MCV 87 06/10/2024     06/10/2024         Lipid Panel:  Lab Results   Component Value Date    CHOL 145 06/09/2024     Lab Results   Component Value Date    HDL 40 06/09/2024     Lab Results   Component Value Date    LDLCALC 62.2 (L) 06/09/2024     Lab Results   Component Value Date    TRIG 214 (H) 06/09/2024     Lab Results   Component Value Date    CHOLHDL 27.6 06/09/2024       Diabetes:  Lab Results   Component Value Date    HGBA1C >15.0 (H) 06/09/2024       Thank you for the consult.      Genesis Ruby MS, RD/LDN, [facility]

## 2024-06-10 NOTE — HOSPITAL COURSE
Patient is a 54 year old Filipino speaking female admitted with chest pain. 3 troponins negative. Stress test negative. Chest pain is resolved. Patient was also found to be newly diagnosed DM which is uncontrolled. Patient was started on Levemir. Patient was extensively counseled on insulin injection, glucose monitoring and hypoglycemic symptoms.

## 2024-06-10 NOTE — PLAN OF CARE
Patient cleared for discharge by case management to home, no needs; after diabetes education is complete and prescriptions are collected from hospital outpatient pharmacy.    Patient states her daughter Ghazal will pick her up.       06/10/24 1308   Final Note   Assessment Type Final Discharge Note   Anticipated Discharge Disposition Home   Hospital Resources/Appts/Education Provided Appointments scheduled and added to AVS

## 2024-06-10 NOTE — PLAN OF CARE
Problem: Diabetes Comorbidity  Goal: Blood Glucose Level Within Targeted Range  Outcome: Progressing     Problem: Adult Inpatient Plan of Care  Goal: Absence of Hospital-Acquired Illness or Injury  Outcome: Progressing     Problem: Adult Inpatient Plan of Care  Goal: Readiness for Transition of Care  Outcome: Progressing

## 2024-06-10 NOTE — DISCHARGE SUMMARY
[facility]  Discharge Summary      Patient Name: Stephanie Harman  MRN: [id number]  DAYLIN: [id number]  Patient Class: IP- Inpatient  Admission Date: 6/8/2024  Hospital Length of Stay: 1 days  Discharge Date and Time:  06/10/2024   Attending Physician: Jonh Phillips MD   Discharging Provider: Jonh Phillips MD  Primary Care Provider: Vandana Primary Doctor    Primary Care Team: Networked reference to record PCT     HPI:   This is a 54-year-old female who presents to the hospital with chest pain.  History is taken from medical personnel as there is language barrier.  Patient was on the road in the car and began to have chest pain.  Pain was pressure-like.  No nausea or vomiting.  Patient has a history of diabetes and hypertension but noncompliant with her medication.  She was brought to the emergency room by way of ambulance.  She was given aspirin and nitroglycerin.  She currently is chest pain-free.  Second troponin show mild elevation at 18.  Due to her history and mild elevated troponin hospitalist asked to admit this patient for further care.    * No surgery found *      Hospital Course:   Patient is a 54 year old [place] speaking female admitted with chest pain. 3 troponins negative. Stress test negative. Chest pain is resolved. Patient was also found to be newly diagnosed DM which is uncontrolled. Patient was started on Levemir. Patient was extensively counseled on insulin injection, glucose monitoring and hypoglycemic symptoms.      Goals of Care Treatment Preferences:  Code Status: Full Code    Physical Exam  Constitutional:       Appearance: Normal appearance.   HENT:      Head: Normocephalic and atraumatic.      Nose: Nose normal.   Eyes:      Pupils: Pupils are equal, round, and reactive to light.   Cardiovascular:      Rate and Rhythm: Normal rate and regular rhythm.   Pulmonary:      Effort: Pulmonary effort is normal.   Abdominal:      Palpations: Abdomen is soft.    Musculoskeletal:      Cervical back: Neck supple.   Skin:     General: Skin is warm.   Neurological:      General: No focal deficit present.      Mental Status: She is alert.   Psychiatric:         Behavior: Behavior normal.     Consults:   Consults (From admission, onward)          Status Ordering Provider     Inpatient consult to Diabetes educator  Once        Provider:  (Not yet assigned)    Acknowledged RICK CHRISTIANSON            Cardiac/Vascular  * Chest pain  Resolved   Negative troponin  Negative stress test  Aspirin, Coreg and Lipitor       Essential hypertension    Carvedilol 3.125 mg b.i.d.    Endocrine  Diabetes mellitus  Started on levemire 12 units   Counseled on insulin injection         Final Active Diagnoses:    Diagnosis Date Noted POA    PRINCIPAL PROBLEM:  Chest pain [R07.9] 06/09/2024 Yes    Diabetes mellitus [E11.9] 06/09/2024 Yes    Essential hypertension [I10] 06/09/2024 Yes      Problems Resolved During this Admission:       Discharged Condition: good    Disposition: Home or Self Care    Follow Up:    Patient Instructions:   No discharge procedures on file.    Significant Diagnostic Studies: Labs: CMP   Recent Labs   Lab 06/08/24  2353 06/09/24  0750 06/10/24  0514   * 136 136   K 3.9 3.8 3.7    103 108   CO2 23 25 24   * 312* 318*   BUN 8 7 13   CREATININE 0.6 0.5 0.5   CALCIUM 8.6* 8.2* 7.6*   PROT 6.7 6.2 5.3*   ALBUMIN 3.8 3.5 3.0*   BILITOT 0.4 0.5 0.4   ALKPHOS 184* 141* 114   AST 14 13 11   ALT 18 15 14   ANIONGAP 7* 8 4*    and CBC   Recent Labs   Lab 06/08/24  2353 06/09/24  0750 06/10/24  0514   WBC 8.43 7.74 6.92   HGB 12.6 12.8 11.4*   HCT 36.6* 37.0 33.4*    266 257       Pending Diagnostic Studies:       None           Medications:  Reconciled Home Medications:      Medication List        START taking these medications      aspirin 81 MG Chew  [place] 1 tableta (81 mg en total) por vía oral vinh vez al día.  (Take 1 tablet (81 mg total) by mouth once  daily.)     atorvastatin 40 MG tablet  Commonly known as: LIPITOR  [place] vinh tableta (40 mg en total) por vía oral diariamente.  (Take 1 tablet (40 mg total) by mouth once daily.)     carvediloL 3.125 MG tablet  Commonly known as: COREG  Take 1 tablet (3.125 mg total) by mouth 2 (two) times daily.     insulin glargine U-100 (Lantus) 100 unit/mL (3 mL) Inpn pen  Inject 12 Units into the skin once daily. for 12 days              Indwelling Lines/Drains at time of discharge:   Lines/Drains/Airways       None                   Time spent on the discharge of patient: 40 minutes         Jonh Phillips MD  Department of Hospital Medicine  [facility]

## 2024-06-11 LAB
OHS QRS DURATION: 72 MS
OHS QTC CALCULATION: 457 MS